# Patient Record
Sex: MALE | Race: WHITE | NOT HISPANIC OR LATINO | Employment: OTHER | URBAN - METROPOLITAN AREA
[De-identification: names, ages, dates, MRNs, and addresses within clinical notes are randomized per-mention and may not be internally consistent; named-entity substitution may affect disease eponyms.]

---

## 2020-02-24 ENCOUNTER — OFFICE VISIT (OUTPATIENT)
Dept: GASTROENTEROLOGY | Facility: CLINIC | Age: 85
End: 2020-02-24
Payer: COMMERCIAL

## 2020-02-24 VITALS
SYSTOLIC BLOOD PRESSURE: 120 MMHG | WEIGHT: 174 LBS | HEIGHT: 66 IN | DIASTOLIC BLOOD PRESSURE: 76 MMHG | BODY MASS INDEX: 27.97 KG/M2 | HEART RATE: 78 BPM

## 2020-02-24 DIAGNOSIS — Z12.11 COLON CANCER SCREENING: ICD-10-CM

## 2020-02-24 DIAGNOSIS — F41.9 ANXIETY: ICD-10-CM

## 2020-02-24 DIAGNOSIS — R11.0 NAUSEA: Primary | ICD-10-CM

## 2020-02-24 DIAGNOSIS — R05.9 COUGH: ICD-10-CM

## 2020-02-24 DIAGNOSIS — K58.0 IRRITABLE BOWEL SYNDROME WITH DIARRHEA: ICD-10-CM

## 2020-02-24 DIAGNOSIS — R10.13 EPIGASTRIC BURNING SENSATION: ICD-10-CM

## 2020-02-24 PROCEDURE — 99204 OFFICE O/P NEW MOD 45 MIN: CPT | Performed by: NURSE PRACTITIONER

## 2020-02-24 RX ORDER — ONDANSETRON 4 MG/1
4 TABLET, ORALLY DISINTEGRATING ORAL EVERY 6 HOURS PRN
Qty: 20 TABLET | Refills: 2 | Status: SHIPPED | OUTPATIENT
Start: 2020-02-24 | End: 2020-06-24 | Stop reason: SDDI

## 2020-02-24 RX ORDER — FAMOTIDINE 20 MG/1
20 TABLET, FILM COATED ORAL DAILY
COMMUNITY
End: 2020-02-24 | Stop reason: DRUGHIGH

## 2020-02-24 RX ORDER — FAMOTIDINE 40 MG/1
40 TABLET, FILM COATED ORAL DAILY
Qty: 30 TABLET | Refills: 2 | Status: SHIPPED | OUTPATIENT
Start: 2020-02-24 | End: 2020-03-25 | Stop reason: ALTCHOICE

## 2020-02-24 RX ORDER — DICYCLOMINE HCL 20 MG
TABLET ORAL
COMMUNITY
Start: 2020-02-01 | End: 2020-06-24 | Stop reason: SDDI

## 2020-02-24 RX ORDER — LORAZEPAM 1 MG/1
1 TABLET ORAL DAILY PRN
COMMUNITY
Start: 2020-02-01

## 2020-02-24 NOTE — PATIENT INSTRUCTIONS
Continue to avoid the nonsteroidal anti-inflammatory medication ( ibuprofen /Motrin / Advil, Aleve /naproxen, full-dose aspirin)   Recommend smaller more frequent meals and avoid late-night eating ( within 2-3 hours of bed)  Consider elevating the head of your bed at night with blocks or a mattress wedge  You will be scheduled for upper scope here at our endoscopy center      Gastroesophageal Reflux Disease   WHAT YOU NEED TO KNOW:   What is gastroesophageal reflux disease? Gastroesophageal reflux occurs when acid and food in the stomach back up into the esophagus  Gastroesophageal reflux disease (GERD) is reflux that occurs more than twice a week for a few weeks  It usually causes heartburn and other symptoms  GERD can cause other health problems over time if it is not treated  What causes GERD? GERD often occurs when the lower muscle (sphincter) of the esophagus does not close properly  The sphincter normally opens to let food into the stomach  It then closes to keep food and stomach acid in the stomach  If the sphincter does not close properly, stomach acid and food back up (reflux) into the esophagus  The following may increase your risk for GERD:  · Certain foods such as spicy foods, chocolate, foods that contain caffeine, peppermint, and fried foods    · Hiatal hernia    · Certain medicines such as calcium channel blockers (used to treat high blood pressure), allergy medicines, sedatives, or antidepressants    · Pregnancy or obesity    · Lying down after a meal    · Drinking alcohol or smoking  What are the signs and symptoms of GERD? Heartburn is the most common symptom of GERD  You may feel burning pain in your chest or below the breast bone  This usually occurs after meals and spreads to your neck, jaw, or shoulder  The pain gets better when you change positions   You may also have any of the following:  · Bitter or acid taste in your mouth    · Dry cough    · Trouble swallowing or pain with swallowing    · Hoarseness or sore throat    · Frequent burping or hiccups    · Feeling of fullness soon after you start eating  How is GERD diagnosed? Your healthcare provider will ask about your symptoms and when they started  Tell him or her about other medical conditions you have, your eating habits, and your activities  You may also need any of the following:  · Esophageal pH monitoring  is used to place a small probe inside your esophagus and stomach to check the amount of acid  · An endoscopy  is a procedure used to look at the inside of your esophagus and stomach  An endoscope is a bendable tube with a light and camera on the end  Your healthcare provider may remove a small sample of tissue and send it to a lab for tests  · Upper GI x-rays  are done to take pictures of your stomach and intestines (bowel)  You may be given a chalky liquid to drink before the pictures are taken  This liquid helps your stomach and intestines show up better on the x-rays  · Esophageal manometry  is a test that shows how your esophagus pushes food and fluid to your stomach  It also shows the pressures in your esophagus and stomach  It may show a hiatal hernia  How is GERD treated? · Medicines  are used to decrease stomach acid  Medicine may also be used to help your lower esophageal sphincter and stomach contract (tighten) more  · Surgery  is done to wrap the upper part of the stomach around the esophageal sphincter  This will strengthen the sphincter and prevent reflux  How can I help manage GERD? · Do not have foods or drinks that may increase heartburn  These include chocolate, peppermint, fried or fatty foods, drinks that contain caffeine, or carbonated drinks (soda)  Other foods include spicy foods, onions, tomatoes, and tomato-based foods  Do not have foods or drinks that can irritate your esophagus, such as citrus fruits, juices, and alcohol  · Do not eat large meals    When you eat a lot of food at one time, your stomach needs more acid to digest it  Eat 6 small meals each day instead of 3 large ones, and eat slowly  Do not eat meals 2 to 3 hours before bedtime  · Elevate the head of your bed  Place 6-inch blocks under the head of your bed frame  You may also use more than one pillow under your head and shoulders while you sleep  · Maintain a healthy weight  If you are overweight, weight loss may help relieve symptoms of GERD  · Do not smoke  Smoking weakens the lower esophageal sphincter and increases the risk of GERD  Ask your healthcare provider for information if you currently smoke and need help to quit  E-cigarettes or smokeless tobacco still contain nicotine  Talk to your healthcare provider before you use these products  · Do not wear clothing that is tight around your waist   Tight clothing can put pressure on your stomach and cause or worsen GERD symptoms  When should I seek immediate care? · You feel full and cannot burp or vomit  · You have severe chest pain and sudden trouble breathing  · Your bowel movements are black, bloody, or tarry-looking  · Your vomit looks like coffee grounds or has blood in it  When should I contact my healthcare provider? · You vomit large amounts, or you vomit often  · You have trouble breathing after you vomit  · You have trouble swallowing, or pain with swallowing  · You are losing weight without trying  · Your symptoms get worse or do not improve with treatment  · You have questions or concerns about your condition or care  CARE AGREEMENT:   You have the right to help plan your care  Learn about your health condition and how it may be treated  Discuss treatment options with your caregivers to decide what care you want to receive  You always have the right to refuse treatment  The above information is an  only  It is not intended as medical advice for individual conditions or treatments   Talk to your doctor, nurse or pharmacist before following any medical regimen to see if it is safe and effective for you  © 2017 [employer] Information is for End User's use only and may not be sold, redistributed or otherwise used for commercial purposes  All illustrations and images included in CareNotes® are the copyrighted property of A [employer] M [employer]  or [employer] Fernandez

## 2020-02-24 NOTE — PROGRESS NOTES
[id number] [facility] - Outpatient Consultation  Ayaka Alejandro 82 y o  male MRN: [id number]  Encounter: [id number]    ASSESSMENT AND PLAN:      1  Nausea  Intermittent nausea, often preprandial beginning in November  History of heavy NSAID use on 1-2 occasions, prednisone  Differential considerations include PUD, gastritis, H pylori, esophagitis, Vega's which may be exacerbated by previous NSAID and steroid use  Biliary disease, more likely biliary dyskinesia is also a consideration although less likely with normal LFTs and no postprandial symptoms  -will check abdominal US  -Zofran p r n   -advised to abstain from NSAID use  -schedule EGD-BM EC  -if ultrasound and EGD are negative and symptoms persist, would proceed with HIDA scan to exclude biliary dyskinesia  - US abdomen complete; Future  - ondansetron (ZOFRAN-ODT) 4 mg disintegrating tablet; Take 1 tablet (4 mg total) by mouth every 6 (six) hours as needed for nausea or vomiting  Dispense: 20 tablet; Refill: 2    2  Epigastric burning sensation  Intermittent episodes since November, worse preprandial, and occasionally occurs with reflux symptoms when supine  Differential considerations include PUD, gastritis, H pylori, esophagitis, Vega's which may be exacerbated by previous NSAID and oral steroid use  Less likely biliary disease as outlined above  Some relief with famotidine 20 mg daily  Reported interaction with PPI and lorazepam so refuses to take these medications  -will increase famotidine to 40 mg daily  -schedule EGD-BM EC  -will check abdominal US  -if EGD and ultrasound negative, consider proceeding with HIDA scan to exclude biliary dyskinesia although less likely  - US abdomen complete; Future  - famotidine (PEPCID) 40 MG tablet; Take 1 tablet (40 mg total) by mouth daily  Dispense: 30 tablet; Refill: 2    3  Cough  Dry, intermittent and may represent atypical symptoms of reflux    Plan as outlined above     4  Irritable bowel syndrome with diarrhea  Longstanding history since previous colonoscopy in 2009 and associated with anxiety  Very rare need for dicyclomine  Took recently for epigastric symptoms which caused constipation  This has been relieved with stool softeners  Otherwise no lower GI symptoms  5  Colon cancer screening  Average risk  Last colonoscopy 12/01/2009 per Dr Atkinson Night which was normal   Ten year recall advised at the time but would not repeat for advanced age  Would schedule if clinically indicated  6  Anxiety  Longstanding and treated with lorazepam p r n  Daughter reports that he may have a psychiatric evaluation for possible medication alternatives  Side effects with Lexapro previously  Follows with PCP for this  Followup Appointment:  4 weeks  ______________________________________________________________________    Chief Complaint   Patient presents with    Change in Bowel Habits     referred by pcp, Dr Mayra Kline Irritable Bowel Syndrome    Nausea       HPI:   Gabriela Camacho is a 82 y o   male who presents , at the request of his PCP, Dr Emilio Lamb, and accompanied by his daughter for stomach issues    The patient has been experiencing predominantly preprandial nausea since prior to Thanksgiving in November  No vomiting  This is often improved postprandial   He is also describing feeling hot in his epigastric area  He further quantify so this as a burning sensation in his epigastric area  Has also been experiencing a dry cough since December  Has experienced couple episodes of bitter taste in his mouth when lying down at night  Two occasions of using Motrin for several weeks and also took a Medrol Dosepak back in May for sciatica  The 2nd occasion of Motrin use was later in the spring, early summer but he cannot recall exactly when  He does admit to taking up to 12 OTC Motrin pills daily for the pain    Previous caffeine use but not excessive and completely stopped drinking coffee recently  Denies chest pain, anorexia, dysphagia, odynophagia, early satiety  Reports that the cough has been improved since beginning Pepcid 20 mg daily  Was originally prescribed Prilosec but would not continue taking as he was sure was interacting with his lorazepam   Because of the symptoms, he is meeting maintaining a light diet to include dry bread, eggs, chicken noodle soup  No unintended weight loss  He does readily admit to significant anxiety including anxiety about using lorazepam   His daughter reports that this fear is because his girlfriend says he should not be taking this medication  Concerned that the lorazepam could be causing his symptoms  The patient reports being diagnosed with IBS, diarrhea predominant many years ago by Dr Peter Chandler who prescribed him Bentyl  He only uses this rarely and reports having daily soft formed stools  When the epigastric symptoms started, he tried using Bentyl 1-2 times per day but got constipated from this so stopped using  Reports that it was not particularly helpful with those upper symptoms anyway  He generally eats prunes and apricots daily  Denies melena, hematochezia, lower abdominal or rectal pain        Historical Information   Past Medical History:   Diagnosis Date    Anemia     Anxiety     Basal cell carcinoma     Hiatal hernia     Hypertension     Irritable bowel syndrome     Melanoma ([address] )     Scan    Sciatica of left side 06/2019    Vertigo 2008     Past Surgical History:   Procedure Laterality Date    COLONOSCOPY  12/01/2009     by Dr Mary Zuniga and was normal    HIATAL HERNIA REPAIR  10/2018    First repair was in 2000, 2nd in 2018   [facility] Bilateral      Social History     Substance and Sexual Activity   Alcohol Use Not Currently    Frequency: 2-3 times a week    Drinks per session: 1 or 2    Binge frequency: Never    Comment:  recently he is not having any alcohol Social History     Substance and Sexual Activity   Drug Use Never     Social History     Tobacco Use   Smoking Status Never Smoker   Smokeless Tobacco Never Used     Family History   Problem Relation Age of Onset    Post-traumatic stress disorder Father     Cancer Family         [facility] a[facility] and uncles[facility]    Colon cancer Neg Hx     Colon polyps Neg Hx        Meds/Allergies     Current Outpatient Medications:     dicyclomine (BENTYL) 20 mg tablet    LORazepam (ATIVAN) 1 mg tablet    famotidine (PEPCID) 40 MG tablet    ondansetron (ZOFRAN-ODT) 4 mg disintegrating tablet    No Known Allergies    PHYSICAL EXAM:    Blood pressure 120/76, pulse 78, height 5' 6" (1 676 m), weight 78 9 kg (174 lb)  Body mass index is 28 08 kg/m²  General Appearance: NAD, cooperative, alert  Head:  Normocephalic, atraumatic  Eyes: Anicteric, PERRLA, EOMI  ENT:  Normal external appearance, normal mucosa  Neck:  Supple, symmetrical, trachea midline,   Resp:  Clear to auscultation bilaterally; no rales, rhonchi or wheezing; respirations unlabored   CV:  S1 S2, Regular rate and rhythm; no murmur, rub, or gallop  GI:  Soft, non-tender, non-distended; normal bowel sounds; no masses, no organomegaly   Rectal: Deferred  Musculoskeletal: No cyanosis, clubbing or edema  Normal ROM  Skin:  No jaundice, rashes, or lesions   Heme/Lymph: No palpable cervical lymphadenopathy  Psych: Normal affect, good eye contact  Neuro: No gross deficits, AAOx3    Lab Results:  Reviewed lab results from September 2019 and February, 2020  No results found for: WBC, HGB, HCT, MCV, PLT  No results found for: NA, K, CL, CO2, ANIONGAP, BUN, CREATININE, GLUCOSE, GLUF, CALCIUM, CORRECTEDCA, AST, ALT, ALKPHOS, PROT, BILITOT, EGFR  No results found for: IRON, TIBC, FERRITIN  No results found for: LIPASE    Radiology Results:   No results found      REVIEW OF SYSTEMS:    CONSTITUTIONAL: Denies any fever, chills, rigors, but reports fatigue and weight loss  HEENT: No earache or tinnitus  Denies hearing loss or visual disturbances  CARDIOVASCULAR: No chest pain or palpitations  Reports occasional irregular heartbeat  RESPIRATORY: Denies any cough, hemoptysis, shortness of breath or dyspnea on exertion  GASTROINTESTINAL: As noted in the History of Present Illness  Reports decreased appetite and change in bowel habits  GENITOURINARY: No problems with urination  Denies any hematuria or dysuria  NEUROLOGIC: No dizziness or vertigo, denies headaches  Reports tremor, tingling/numbness  MUSCULOSKELETAL: Denies any muscle or joint pain  Hematology:  Reports easy bruising  SKIN: Denies skin rashes or itching  ENDOCRINE: Denies excessive thirst  Denies intolerance to heat or cold  PSYCHOSOCIAL:  Reports depression, anxiety and difficulty sleeping  Denies any recent memory loss

## 2020-02-24 NOTE — LETTER
February 24, 2020     Blanca Cueto DO  [id number]  [facility] [address]    Patient: Brianna Quiñonez   YOB: 1935   Date of Visit: 2/24/2020       Dear Dr Oscar Hensley: Thank you for referring Sumit Mendoza to me for evaluation  Below are my notes for this consultation  If you have questions, please do not hesitate to call me  I look forward to following your patient along with you           Sincerely,        ACRLOS Gramajo        CC: No Recipients

## 2020-02-26 ENCOUNTER — LAB REQUISITION (OUTPATIENT)
Dept: LAB | Facility: HOSPITAL | Age: 85
End: 2020-02-26
Payer: COMMERCIAL

## 2020-02-26 DIAGNOSIS — R10.13 EPIGASTRIC BURNING SENSATION: Primary | ICD-10-CM

## 2020-02-26 DIAGNOSIS — R10.13 EPIGASTRIC PAIN: ICD-10-CM

## 2020-02-26 DIAGNOSIS — K29.70 GASTRITIS, UNSPECIFIED, WITHOUT BLEEDING: ICD-10-CM

## 2020-02-26 DIAGNOSIS — R11.0 NAUSEA: ICD-10-CM

## 2020-02-26 PROCEDURE — 88305 TISSUE EXAM BY PATHOLOGIST: CPT | Performed by: PATHOLOGY

## 2020-02-26 PROCEDURE — 88313 SPECIAL STAINS GROUP 2: CPT | Performed by: PATHOLOGY

## 2020-02-26 RX ORDER — PANTOPRAZOLE SODIUM 40 MG/1
40 TABLET, DELAYED RELEASE ORAL DAILY
Qty: 30 TABLET | Refills: 1 | Status: SHIPPED | OUTPATIENT
Start: 2020-02-26 | End: 2020-03-25 | Stop reason: SDUPTHER

## 2020-03-03 NOTE — RESULT ENCOUNTER NOTE
I spoke with the patient's daughter by phone  No EGD recall has appointment scheduled in he will keep it

## 2020-03-23 ENCOUNTER — TELEPHONE (OUTPATIENT)
Dept: GASTROENTEROLOGY | Facility: CLINIC | Age: 85
End: 2020-03-23

## 2020-03-23 NOTE — TELEPHONE ENCOUNTER
Pt's daughter, Fitz Robles, states her Dad is flipping out because he thinks his meds will make him more susceptible to getting the virus/wants to cut back on meds; takes Protonix and Pepcid  He has OV Wed/she questions if he should keep it?  CB# [phone]

## 2020-03-23 NOTE — TELEPHONE ENCOUNTER
Spoke with daughter  She prefers patient not coming to office due to his age and Cody concerns  They are willing to change OV to telemedicine  Please contact daughter to schedule

## 2020-03-25 ENCOUNTER — TELEMEDICINE (OUTPATIENT)
Dept: GASTROENTEROLOGY | Facility: CLINIC | Age: 85
End: 2020-03-25
Payer: COMMERCIAL

## 2020-03-25 VITALS — HEIGHT: 66 IN | WEIGHT: 170 LBS | BODY MASS INDEX: 27.32 KG/M2

## 2020-03-25 DIAGNOSIS — R05.9 COUGH: ICD-10-CM

## 2020-03-25 DIAGNOSIS — K58.0 IRRITABLE BOWEL SYNDROME WITH DIARRHEA: ICD-10-CM

## 2020-03-25 DIAGNOSIS — R11.0 NAUSEA: ICD-10-CM

## 2020-03-25 DIAGNOSIS — F41.9 ANXIETY: ICD-10-CM

## 2020-03-25 DIAGNOSIS — R10.13 EPIGASTRIC BURNING SENSATION: ICD-10-CM

## 2020-03-25 DIAGNOSIS — Z12.11 COLON CANCER SCREENING: ICD-10-CM

## 2020-03-25 DIAGNOSIS — K25.9 GASTRIC EROSION, UNSPECIFIED CHRONICITY: Primary | ICD-10-CM

## 2020-03-25 PROCEDURE — 99214 OFFICE O/P EST MOD 30 MIN: CPT | Performed by: NURSE PRACTITIONER

## 2020-03-25 RX ORDER — PANTOPRAZOLE SODIUM 40 MG/1
40 TABLET, DELAYED RELEASE ORAL DAILY
Qty: 30 TABLET | Refills: 4 | Status: SHIPPED | OUTPATIENT
Start: 2020-03-25 | End: 2020-06-24 | Stop reason: SDDI

## 2020-03-25 NOTE — PATIENT INSTRUCTIONS
Continue the pantoprazole daily in the morning   stop the famotidine at bedtime   continue to use the dicyclomine ( Bentyl) as needed for abdominal cramping   continue to use Zofran as needed for nausea  Continue to avoid NSAIDs ( ibuprofen /Motrin / Advil, Aleve /naproxen, full-dose aspirin)      Gastroesophageal Reflux Disease   WHAT YOU NEED TO KNOW:   What is gastroesophageal reflux disease? Gastroesophageal reflux occurs when acid and food in the stomach back up into the esophagus  Gastroesophageal reflux disease (GERD) is reflux that occurs more than twice a week for a few weeks  It usually causes heartburn and other symptoms  GERD can cause other health problems over time if it is not treated  What causes GERD? GERD often occurs when the lower muscle (sphincter) of the esophagus does not close properly  The sphincter normally opens to let food into the stomach  It then closes to keep food and stomach acid in the stomach  If the sphincter does not close properly, stomach acid and food back up (reflux) into the esophagus  The following may increase your risk for GERD:  · Certain foods such as spicy foods, chocolate, foods that contain caffeine, peppermint, and fried foods    · Hiatal hernia    · Certain medicines such as calcium channel blockers (used to treat high blood pressure), allergy medicines, sedatives, or antidepressants    · Pregnancy or obesity    · Lying down after a meal    · Drinking alcohol or smoking  What are the signs and symptoms of GERD? Heartburn is the most common symptom of GERD  You may feel burning pain in your chest or below the breast bone  This usually occurs after meals and spreads to your neck, jaw, or shoulder  The pain gets better when you change positions   You may also have any of the following:  · Bitter or acid taste in your mouth    · Dry cough    · Trouble swallowing or pain with swallowing    · Hoarseness or sore throat    · Frequent burping or hiccups    · Feeling of fullness soon after you start eating  How is GERD diagnosed? Your healthcare provider will ask about your symptoms and when they started  Tell him or her about other medical conditions you have, your eating habits, and your activities  You may also need any of the following:  · Esophageal pH monitoring  is used to place a small probe inside your esophagus and stomach to check the amount of acid  · An endoscopy  is a procedure used to look at the inside of your esophagus and stomach  An endoscope is a bendable tube with a light and camera on the end  Your healthcare provider may remove a small sample of tissue and send it to a lab for tests  · Upper GI x-rays  are done to take pictures of your stomach and intestines (bowel)  You may be given a chalky liquid to drink before the pictures are taken  This liquid helps your stomach and intestines show up better on the x-rays  · Esophageal manometry  is a test that shows how your esophagus pushes food and fluid to your stomach  It also shows the pressures in your esophagus and stomach  It may show a hiatal hernia  How is GERD treated? · Medicines  are used to decrease stomach acid  Medicine may also be used to help your lower esophageal sphincter and stomach contract (tighten) more  · Surgery  is done to wrap the upper part of the stomach around the esophageal sphincter  This will strengthen the sphincter and prevent reflux  How can I help manage GERD? · Do not have foods or drinks that may increase heartburn  These include chocolate, peppermint, fried or fatty foods, drinks that contain caffeine, or carbonated drinks (soda)  Other foods include spicy foods, onions, tomatoes, and tomato-based foods  Do not have foods or drinks that can irritate your esophagus, such as citrus fruits, juices, and alcohol  · Do not eat large meals  When you eat a lot of food at one time, your stomach needs more acid to digest it   Eat 6 small meals each day instead of 3 large ones, and eat slowly  Do not eat meals 2 to 3 hours before bedtime  · Elevate the head of your bed  Place 6-inch blocks under the head of your bed frame  You may also use more than one pillow under your head and shoulders while you sleep  · Maintain a healthy weight  If you are overweight, weight loss may help relieve symptoms of GERD  · Do not smoke  Smoking weakens the lower esophageal sphincter and increases the risk of GERD  Ask your healthcare provider for information if you currently smoke and need help to quit  E-cigarettes or smokeless tobacco still contain nicotine  Talk to your healthcare provider before you use these products  · Do not wear clothing that is tight around your waist   Tight clothing can put pressure on your stomach and cause or worsen GERD symptoms  When should I seek immediate care? · You feel full and cannot burp or vomit  · You have severe chest pain and sudden trouble breathing  · Your bowel movements are black, bloody, or tarry-looking  · Your vomit looks like coffee grounds or has blood in it  When should I contact my healthcare provider? · You vomit large amounts, or you vomit often  · You have trouble breathing after you vomit  · You have trouble swallowing, or pain with swallowing  · You are losing weight without trying  · Your symptoms get worse or do not improve with treatment  · You have questions or concerns about your condition or care  CARE AGREEMENT:   You have the right to help plan your care  Learn about your health condition and how it may be treated  Discuss treatment options with your caregivers to decide what care you want to receive  You always have the right to refuse treatment  The above information is an  only  It is not intended as medical advice for individual conditions or treatments   Talk to your doctor, nurse or pharmacist before following any medical regimen to see if it is safe and effective for you  © 2017 [employer] Information is for End User's use only and may not be sold, redistributed or otherwise used for commercial purposes  All illustrations and images included in CareNotes® are the copyrighted property of A [employer] M [employer]  or Carlos Fernandez

## 2020-03-25 NOTE — PROGRESS NOTES
Virtual Regular Visit    Problem List Items Addressed This Visit        Digestive    Irritable bowel syndrome with diarrhea    Gastric erosion - Primary    Relevant Medications    pantoprazole (PROTONIX) 40 mg tablet       Other    Colon cancer screening    Anxiety    RESOLVED: Nausea    RESOLVED: Cough    RESOLVED: Epigastric burning sensation    Relevant Medications    pantoprazole (PROTONIX) 40 mg tablet        1  Gastric erosion, unspecified chronicity  2  Epigastric burning sensation  EGD 02/26/2020 did show erosive gastritis  This was probably precipitated by his NSAID use and anxiety  Pathology negative for H pylori, dysplasia  No longer having any epigastric burning  He is reticent to continue medication  -reviewed GERD diet and lifestyle modifications which will continue  GERD information was provided to the patient  -continue pantoprazole 40 mg daily  If he remains symptom-free at follow-up, will wean and discontinue  -will stop famotidine as requested  -continue avoidance of all NSAIDs  - pantoprazole (PROTONIX) 40 mg tablet; Take 1 tablet (40 mg total) by mouth daily  Dispense: 30 tablet; Refill: 4    3  Nausea  Nausea completely resolved after the addition of daily PPI and H2B at [facility]  There was no evidence for PUD with EGD  Ultrasound and LFTs were normal making biliary disease unlikely  He has used 1 dose of Zofran  Nausea may have been related to the erosive gastritis  -may continue to use Zofran p r n if nausea returns    4  Cough  Dry intermittent cough may have represented atypical reflux symptoms  Completely resolved after initiation of PPI  5  Irritable bowel syndrome with diarrhea  Longstanding history since previous colonoscopy in 2009 and associated with anxiety  he is exercising to manage his anxiety and no longer using lorazepam   Very rare need for dicyclomine  No lower GI symptoms  He is having regular bowel movements eating daily plums and apricots      -continue with dietary discretion  -continue exercise to help with anxiety  -continue dicyclomine p r n     6  Colon cancer screening  Average risk  Last colonoscopy 12/01/2009 per Dr Jean Ho which was normal   Ten year recall advised but would not repeat for advanced age  Would schedule if clinically indicated  7  Anxiety  Longstanding and previous treated with lorazepam p r n  Follows with PCP for this  Exacerbates his IBS  He is no longer using lorazepam and reports that he is using daily exercise to control his anxiety  The daughter confirms  Reason for visit is follow up from EGD procedure    Encounter provider CARLOS Stephenson    Provider located at [address] [address] [phone]      Recent Visits  Date Type Provider Dept   03/23/20 Telephone Frida Gonzalez   Showing recent visits within past 7 days and meeting all other requirements     Future Appointments  No visits were found meeting these conditions  Showing future appointments within next 150 days and meeting all other requirements        After connecting through [facility], the patient was identified by name and date of birth  Erica Paez was informed that this is a telemedicine visit and that the visit is being conducted through [employer] and patient was informed that this is not a secure, HIPAA-complaint platform  he agrees to proceed  which may not be secure and therefore, might not be HIPAA-compliant  My office door was closed  No one else was in the room  He acknowledged consent and understanding of privacy and security of the video platform  The patient has agreed to participate and understands they can discontinue the visit at any time  Aj Sandhu is a 82 y o  male who is evaluated by video and attended by his daughter as well    Reports that he is no longer having any epigastric pain or nausea since starting on daily PPI and H2B at HS  He wants to stop medications  He is using exercise to manage his anxiety and reports completely stopping the use of lorazepam   Denies chest pain, UGI or alarm symptoms  He is having 1 formed bowel movement about every other day and is eating plums and apricots to facilitate  He has not used any dicyclomine in 3 weeks, experiencing no lower abdominal cramping  Denies melena, hematochezia  Appetite is normal   Weight is stable  Past Medical History:   Diagnosis Date    Anemia     Anxiety     Basal cell carcinoma     Hiatal hernia     Hypertension     Irritable bowel syndrome     Melanoma ([address] )     Scan    Sciatica of left side 06/2019    Vertigo 2008       Past Surgical History:   Procedure Laterality Date    COLONOSCOPY  12/01/2009     by Dr Bette Hampton and was normal    HIATAL HERNIA REPAIR  10/2018    First repair was in 2000, 2nd in 2018   [facility]        Current Outpatient Medications   Medication Sig Dispense Refill    dicyclomine (BENTYL) 20 mg tablet TAKE 1 TABLET AS NEEDED FOUR TIMES A DAY ORALLY 30 DAYS      LORazepam (ATIVAN) 1 mg tablet Take 1 mg by mouth daily as needed      ondansetron (ZOFRAN-ODT) 4 mg disintegrating tablet Take 1 tablet (4 mg total) by mouth every 6 (six) hours as needed for nausea or vomiting 20 tablet 2    pantoprazole (PROTONIX) 40 mg tablet Take 1 tablet (40 mg total) by mouth daily 30 tablet 4     No current facility-administered medications for this visit        Family History   Problem Relation Age of Onset    Post-traumatic stress disorder Father     Cancer Family         [facility] and [facility]    Colon cancer Neg Hx     Colon polyps Neg Hx      Social History     Socioeconomic History    Marital status:      Spouse name: None    Number of children: None    Years of education: None    Highest education level: None   Occupational History    None   Social Needs    Financial resource strain: None    Food insecurity:     Worry: None     Inability: None    Transportation needs:     Medical: None     Non-medical: None   Tobacco Use    Smoking status: Never Smoker    Smokeless tobacco: Never Used   Substance and Sexual Activity    Alcohol use: Not Currently     Frequency: 2-3 times a week     Drinks per session: 1 or 2     Binge frequency: Never     Comment:  recently he is not having any alcohol    Drug use: Never    Sexual activity: None   Lifestyle    Physical activity:     Days per week: None     Minutes per session: None    Stress: None   Relationships    Social connections:     Talks on phone: None     Gets together: None     Attends [employer] witness service: None     Active member of club or organization: None     Attends meetings of clubs or organizations: None     Relationship status: None    Intimate partner violence:     Fear of current or ex partner: None     Emotionally abused: None     Physically abused: None     Forced sexual activity: None   Other Topics Concern    None   Social History Narrative    None         No Known Allergies    Review of Systems     ROS:  All systems were reviewed and positives noted as per HPI  All other systems were reported as negative  Physical Exam     General Appearance: NAD, cooperative, alert  Head:  Normocephalic, atraumatic  ENT:  Normal external appearance, normal mucosa  Neck:  Supple, symmetrical, trachea midline  GI:   appeared non-tender, non-distended per visual assessment assisted by patient's daughter who palpated his abdomen  Musculoskeletal:  Normal ROM  Psych: Normal affect, good eye contact  Neuro: No gross deficits, AAOx3      I spent 25 minutes with the patient during this visit  Follow-up in 3 months

## 2020-03-27 ENCOUNTER — TELEPHONE (OUTPATIENT)
Dept: GASTROENTEROLOGY | Facility: CLINIC | Age: 85
End: 2020-03-27

## 2020-03-27 NOTE — TELEPHONE ENCOUNTER
Pt's daughter, Penelope Walsh, left [facility] asking if someone can call back in the Famotidine 40 mg script? [facility] messed up and cancelled the script instead of taking off automatic refill  # [phone]

## 2020-06-24 ENCOUNTER — OFFICE VISIT (OUTPATIENT)
Dept: GASTROENTEROLOGY | Facility: CLINIC | Age: 85
End: 2020-06-24
Payer: COMMERCIAL

## 2020-06-24 VITALS
WEIGHT: 170 LBS | SYSTOLIC BLOOD PRESSURE: 156 MMHG | HEART RATE: 71 BPM | TEMPERATURE: 98.1 F | HEIGHT: 66 IN | DIASTOLIC BLOOD PRESSURE: 96 MMHG | BODY MASS INDEX: 27.32 KG/M2

## 2020-06-24 DIAGNOSIS — K58.0 IRRITABLE BOWEL SYNDROME WITH DIARRHEA: ICD-10-CM

## 2020-06-24 DIAGNOSIS — K25.9 GASTRIC EROSION, UNSPECIFIED CHRONICITY: Primary | ICD-10-CM

## 2020-06-24 DIAGNOSIS — F41.9 ANXIETY: ICD-10-CM

## 2020-06-24 DIAGNOSIS — Z12.11 COLON CANCER SCREENING: ICD-10-CM

## 2020-06-24 PROCEDURE — 99214 OFFICE O/P EST MOD 30 MIN: CPT | Performed by: NURSE PRACTITIONER

## 2021-03-02 DIAGNOSIS — Z23 ENCOUNTER FOR IMMUNIZATION: ICD-10-CM

## 2021-03-18 ENCOUNTER — IMMUNIZATIONS (OUTPATIENT)
Dept: FAMILY MEDICINE CLINIC | Facility: HOSPITAL | Age: 86
End: 2021-03-18

## 2021-03-18 DIAGNOSIS — Z23 ENCOUNTER FOR IMMUNIZATION: Primary | ICD-10-CM

## 2021-03-18 PROCEDURE — 0001A SARS-COV-2 / COVID-19 MRNA VACCINE (PFIZER-BIONTECH) 30 MCG: CPT

## 2021-03-18 PROCEDURE — 91300 SARS-COV-2 / COVID-19 MRNA VACCINE (PFIZER-BIONTECH) 30 MCG: CPT

## 2021-04-09 ENCOUNTER — IMMUNIZATIONS (OUTPATIENT)
Dept: FAMILY MEDICINE CLINIC | Facility: HOSPITAL | Age: 86
End: 2021-04-09

## 2021-04-09 DIAGNOSIS — Z23 ENCOUNTER FOR IMMUNIZATION: Primary | ICD-10-CM

## 2021-04-09 PROCEDURE — 91300 SARS-COV-2 / COVID-19 MRNA VACCINE (PFIZER-BIONTECH) 30 MCG: CPT

## 2021-04-09 PROCEDURE — 0002A SARS-COV-2 / COVID-19 MRNA VACCINE (PFIZER-BIONTECH) 30 MCG: CPT

## 2022-01-19 ENCOUNTER — OFFICE VISIT (OUTPATIENT)
Dept: GASTROENTEROLOGY | Facility: CLINIC | Age: 87
End: 2022-01-19
Payer: COMMERCIAL

## 2022-01-19 VITALS
HEIGHT: 66 IN | DIASTOLIC BLOOD PRESSURE: 78 MMHG | SYSTOLIC BLOOD PRESSURE: 154 MMHG | BODY MASS INDEX: 25.71 KG/M2 | WEIGHT: 160 LBS

## 2022-01-19 DIAGNOSIS — R19.4 CHANGE IN BOWEL HABITS: Primary | ICD-10-CM

## 2022-01-19 DIAGNOSIS — R10.31 RIGHT LOWER QUADRANT ABDOMINAL PAIN: ICD-10-CM

## 2022-01-19 PROCEDURE — 99214 OFFICE O/P EST MOD 30 MIN: CPT | Performed by: INTERNAL MEDICINE

## 2022-01-19 RX ORDER — VITAMIN B COMPLEX
TABLET ORAL
COMMUNITY
Start: 2019-01-01

## 2022-01-19 NOTE — PATIENT INSTRUCTIONS
Continue Metamucil once a day with a big glass of water  Use a half cap full of MiraLax as needed if you feel like a getting constipated  Her CT scan of the abdomen / pelvis to evaluate right lower quadrant abdominal pain     Depending how you respond to the current regiment and what the CT scan looks like can decide whether we do a colonoscopy

## 2022-01-19 NOTE — PROGRESS NOTES
[facility] - Outpatient Consultation  Yessica Combs 82 y o  male MRN: [id number]  Encounter: [id number]          ASSESSMENT AND PLAN:      1  Change in bowel habits  2  Right lower quadrant abdominal pain  Previous history of diarrhea predominant IBS now more issues with constipation and right lower quadrant abdominal pain  Metamucil no help  MiraLax gave him diarrhea  Exam with fullness in the right lower quadrant  Last colonoscopy 2009  - CT abdomen pelvis w contrast; Future  - continue Metamucil daily  - use MiraLax half cap full as needed  - ultimately may need repeat colonoscopy    ______________________________________________________________________    HPI:  The patient is seen in the office today for evaluation of his change in bowel habits in long right lower quadrant abdominal pain  He has been seen in the office secondary to erosive gastritis as well as some diarrhea predominant irritable bowel syndrome  He reports over the last 6 months he has received 2 courses of antibiotics once for cellulitis and once for Lyme disease  Following that he has had issues with his bowels  He reports constipation incomplete evacuation  Denies any rectal bleeding  He reports increasing issues with right lower quadrant abdominal pain  He denies any upper GI symptoms  He has put on Metamucil which did not really help  A trial of MiraLax resulted in diarrhea  His weight has been stable  His last colonoscopy was 2009  He reports ongoing issues with mild confusion  He is being evaluated by Neurology  REVIEW OF SYSTEMS:    CONSTITUTIONAL: Denies any fever, chills, rigors, and weight loss  HEENT: No earache or tinnitus  Denies hearing loss or visual disturbances  CARDIOVASCULAR: No chest pain or palpitations  RESPIRATORY: Denies any cough, hemoptysis, shortness of breath or dyspnea on exertion  GASTROINTESTINAL: As noted in the History of Present Illness  GENITOURINARY: No problems with urination  Denies any hematuria or dysuria  NEUROLOGIC: No dizziness or vertigo, denies headaches  MUSCULOSKELETAL: Denies any muscle or joint pain  SKIN: Denies skin rashes or itching  ENDOCRINE: Denies excessive thirst  Denies intolerance to heat or cold  PSYCHOSOCIAL: Denies depression or anxiety  Denies any recent memory loss  Historical Information   Past Medical History:   Diagnosis Date    Anemia     Anxiety     Basal cell carcinoma     [facility] [address] [address] [address] 2017    melanoma    Hiatal hernia     Hypertension     Irritable bowel syndrome     [facility]an[facility]     Scan    Sciatica of left side 06/2019    Vertigo 2008     Past Surgical History:   Procedure Laterality Date    COLONOSCOPY  12/01/2009     by Dr Shelby Denton and was normal    HIATAL HERNIA REPAIR  10/2018    First repair was in 2000, 2nd in 2018   [facility] Bilateral      Social History   Social History     Substance and Sexual Activity   Alcohol Use Not Currently    Alcohol/week: 0 0 standard drinks    Comment:  recently he is not having any alcohol     Social History     Substance and Sexual Activity   Drug Use Never     Social History     Tobacco Use   Smoking Status Never Smoker   Smokeless Tobacco Never Used     Family History   Problem Relation Age of Onset    Post-traumatic stress disorder Father     Depression Father         PTSD    Cancer Family         Multiple cancers in aunts and uncles, in [facility] Colon cancer Neg Hx     Colon polyps Neg Hx        Meds/Allergies       Current Outpatient Medications:     cholecalciferol (VITAMIN D3) 25 mcg (1,000 units) tablet    cyanocobalamin (VITAMIN B-12) 500 MCG tablet    Multiple Vitamin (MULTIVITAMIN ADULT PO)    psyllium (METAMUCIL) 58 6 % packet    LORazepam (ATIVAN) 1 mg tablet    No Known Allergies        Objective     Blood pressure 154/78, height 5' 6" (1 676 m), weight 72 6 kg (160 lb)   Body mass index is 25 82 kg/m²         PHYSICAL EXAM:      General Appearance:   Alert, cooperative, no distress   HEENT:   Normocephalic, atraumatic, anicteric      Neck:  Supple, symmetrical, trachea midline   Lungs:   Clear to auscultation bilaterally; no rales, rhonchi or wheezing; respirations unlabored    Heart[de-identified]   Regular rate and rhythm; no murmur, rub, or gallop  Abdomen:   Soft, non-tender, non-distended; normal bowel sounds; nontender right lower quadrant mass, no organomegaly    Genitalia:   Deferred    Rectal:   Deferred    Extremities:  No cyanosis, clubbing or edema    Pulses:  2+ and symmetric    Skin:  No jaundice, rashes, or lesions    Lymph nodes:  No palpable cervical lymphadenopathy        Lab Results:    CMP, CBC, & B12/folate normal (12/15/21)      Radiology Results:   No results found

## 2022-02-04 ENCOUNTER — TELEPHONE (OUTPATIENT)
Dept: GASTROENTEROLOGY | Facility: CLINIC | Age: 87
End: 2022-02-04

## 2022-02-04 NOTE — TELEPHONE ENCOUNTER
Amie from [facility] CT Scan states Pt has proc Monday at 2:00; last labs were from 12/15-too old  Conf'd last labs I see are 12/15 from [facility]   If Luis Alberto Couch [phone]

## 2022-02-04 NOTE — TELEPHONE ENCOUNTER
Spoke to family member  Pt had labs in January at [facility]   Printed labs from 1/24/2022    [facility] CT notified and labs faxed to [phone]

## 2022-03-21 ENCOUNTER — OFFICE VISIT (OUTPATIENT)
Dept: GASTROENTEROLOGY | Facility: CLINIC | Age: 87
End: 2022-03-21
Payer: COMMERCIAL

## 2022-03-21 VITALS
WEIGHT: 172.6 LBS | HEIGHT: 66 IN | DIASTOLIC BLOOD PRESSURE: 80 MMHG | SYSTOLIC BLOOD PRESSURE: 152 MMHG | BODY MASS INDEX: 27.74 KG/M2

## 2022-03-21 DIAGNOSIS — K59.01 SLOW TRANSIT CONSTIPATION: ICD-10-CM

## 2022-03-21 DIAGNOSIS — K21.9 GASTROESOPHAGEAL REFLUX DISEASE, UNSPECIFIED WHETHER ESOPHAGITIS PRESENT: Primary | ICD-10-CM

## 2022-03-21 PROCEDURE — 99214 OFFICE O/P EST MOD 30 MIN: CPT | Performed by: INTERNAL MEDICINE

## 2022-03-21 NOTE — PROGRESS NOTES
[facility] - Outpatient Follow-up Note  Uriarte  82 y o  male MRN: [id number]  Encounter: [id number]    ASSESSMENT AND PLAN:      1  Gastroesophageal reflux disease, unspecified whether esophagitis present  Vague upper GI symptoms which could be consistent with acid reflux disease  Does not want to take any acid blockers  - elevate the head of the bed  - avoid eating before going to bed  - okay to use low-dose Pepcid at bedtime if he is willing    2  Slow transit constipation  History of diarrhea predominant IBS  Now having more issues with constipation  Reports even low-dose MiraLax gives him diarrhea  Colace has been ineffective in the past  - fiber supplementation daily  - can use low-dose MiraLax or Colace as needed for constipation  - at this point time we are not going to repeat his colonoscopy      Followup Appointment:  6 months  ______________________________________________________________________    Chief Complaint   Patient presents with    Follow IBS     HPI:  The patient is seen back in the office today  He was evaluated early this year related to right-sided abdominal pain and constipation  He also had issues with GERD  CT scan revealed significant constipation but no other significant findings  He was treated with Metamucil and increase fluid intake  He was supposed to use a half a capful of MiraLax daily since he reports MiraLax gives him diarrhea  He reports he is moving his bowels better on the Metamucil alone  He is continues to be apprehensive to take MiraLax or Colace  He continues to have vague right-sided abdominal pain  He is also having issues with nighttime reflux and heartburn  He reports that he has issues sleeping will sometimes wake up in the middle the night  He denies any dysphagia, odynophagia, early satiety  His weight is stable      Historical Information   Past Medical History:   Diagnosis Date    Anemia     Anxiety  Basal cell carcinoma     Cancer ([address] 2017    melanoma    Hiatal hernia     Hypertension     Irritable bowel syndrome     Melanoma ([address]     Scan    Sciatica of left side 06/2019    Vertigo 2008     Past Surgical History:   Procedure Laterality Date    COLONOSCOPY  12/01/2009     by Dr Bettye Ann and was normal    HIATAL HERNIA REPAIR  10/2018    First repair was in 2000, 2nd in 2018   [facility] Bilateral      Social History     Substance and Sexual Activity   Alcohol Use Not Currently    Alcohol/week: 0 0 standard drinks    Comment:  recently he is not having any alcohol     Social History     Substance and Sexual Activity   Drug Use Never     Social History     Tobacco Use   Smoking Status Never Smoker   Smokeless Tobacco Never Used     Family History   Problem Relation Age of Onset    Post-traumatic stress disorder Father     Depression Father         PTSD    Cancer Family         Multiple cancers in aunts and uncles, in [place] Colon cancer Neg Hx     Colon polyps Neg Hx          Current Outpatient Medications:     cholecalciferol (VITAMIN D3) 25 mcg (1,000 units) tablet    cyanocobalamin (VITAMIN B-12) 500 MCG tablet    Multiple Vitamin (MULTIVITAMIN ADULT PO)    psyllium (METAMUCIL) 58 6 % packet    LORazepam (ATIVAN) 1 mg tablet  No Known Allergies  Reviewed medications and allergies and updated as indicated    PHYSICAL EXAM:    Blood pressure 152/80, height 5' 6" (1 676 m), weight 78 3 kg (172 lb 9 6 oz)  Body mass index is 27 86 kg/m²  General Appearance: NAD, cooperative, alert  Eyes: Anicteric, PERRLA, EOMI  ENT:  Normocephalic, atraumatic, normal mucosa  Neck:  Supple, symmetrical, trachea midline  Resp:  Clear to auscultation bilaterally; no rales, rhonchi or wheezing; respirations unlabored   CV:  S1 S2, Regular rate and rhythm; no murmur, rub, or gallop    GI:  Soft, non-tender, non-distended; normal bowel sounds; no masses, no organomegaly   Rectal: Deferred  Musculoskeletal: No cyanosis, clubbing or edema  Normal ROM  Skin:  No jaundice, rashes, or lesions   Heme/Lymph: No palpable cervical lymphadenopathy  Psych: Normal affect, good eye contact  Neuro: No gross deficits, AAOx3    Lab Results:   None recently  Radiology Results:   No results found

## 2022-03-21 NOTE — PATIENT INSTRUCTIONS
Elevate the head of the bed at the bed posts  Continue Metamucil with large glass of water daily  Can use Colace or MiraLax as needed  Can use antacids or Pepcid as needed for reflux    Can try melatonin or herbal tea at bedtime to see if this helps

## 2023-01-08 NOTE — LETTER
March 21, 2022     Lisa Cohn DO  [address] [facility] [address]    Patient: Cuong Patino   YOB: 1935   Date of Visit: 3/21/2022       Dear Dr Mullen Shown: Thank you for referring Carmen Cuevas to me for evaluation  Below are my notes for this consultation  If you have questions, please do not hesitate to call me  I look forward to following your patient along with you  Sincerely,        Kimberly Govea MD        CC: No Recipients  Kimberly Govea MD  3/21/2022  4:02 PM  Sign when Signing Visit  [facility] - Outpatient Follow-up Note  Cuong Patino 82 y o  male MRN: [id number]  Encounter: [id number]    ASSESSMENT AND PLAN:      1  Gastroesophageal reflux disease, unspecified whether esophagitis present  Vague upper GI symptoms which could be consistent with acid reflux disease  Does not want to take any acid blockers  - elevate the head of the bed  - avoid eating before going to bed  - okay to use low-dose Pepcid at bedtime if he is willing    2  Slow transit constipation  History of diarrhea predominant IBS  Now having more issues with constipation  Reports even low-dose MiraLax gives him diarrhea  Colace has been ineffective in the past  - fiber supplementation daily  - can use low-dose MiraLax or Colace as needed for constipation  - at this point time we are not going to repeat his colonoscopy      Followup Appointment:  6 months  ______________________________________________________________________    Chief Complaint   Patient presents with    Follow IBS     HPI:  The patient is seen back in the office today  He was evaluated early this year related to right-sided abdominal pain and constipation  He also had issues with GERD  CT scan revealed significant constipation but no other significant findings  He was treated with Metamucil and increase fluid intake    He was supposed to use a half a capful of MiraLax daily since he reports MiraLax gives him diarrhea  He reports he is moving his bowels better on the Metamucil alone  He is continues to be apprehensive to take MiraLax or Colace  He continues to have vague right-sided abdominal pain  He is also having issues with nighttime reflux and heartburn  He reports that he has issues sleeping will sometimes wake up in the middle the night  He denies any dysphagia, odynophagia, early satiety  His weight is stable      Historical Information   Past Medical History:   Diagnosis Date    Anemia     Anxiety     Basal cell carcinoma     Cancer ([address] ) 2017    melanoma    Hiatal hernia     Hypertension     Irritable bowel syndrome     Melanoma ([address]     Scan    Sciatica of left side 06/2019    Vertigo 2008     Past Surgical History:   Procedure Laterality Date    COLONOSCOPY  12/01/2009     by Dr Guille Johnson and was normal    HIATAL HERNIA REPAIR  10/2018    First repair was in 2000, 2nd in 2018   [facility] Bilateral      Social History     Substance and Sexual Activity   Alcohol Use Not Currently    Alcohol/week: 0 0 standard drinks    Comment:  recently he is not having any alcohol     Social History     Substance and Sexual Activity   Drug Use Never     Social History     Tobacco Use   Smoking Status Never Smoker   Smokeless Tobacco Never Used     Family History   Problem Relation Age of Onset    Post-traumatic stress disorder Father     Depression Father         PTSD    Cancer Family         Multiple cancers in aunts and uncles, in [place] Colon cancer Neg Hx     Colon polyps Neg Hx          Current Outpatient Medications:     cholecalciferol (VITAMIN D3) 25 mcg (1,000 units) tablet    cyanocobalamin (VITAMIN B-12) 500 MCG tablet    Multiple Vitamin (MULTIVITAMIN ADULT PO)    psyllium (METAMUCIL) 58 6 % packet    LORazepam (ATIVAN) 1 mg tablet  No Known Allergies  Reviewed medications and allergies and updated as indicated    PHYSICAL EXAM:    Blood pressure 152/80, height 5' 6" (1 676 m), weight 78 3 kg (172 lb 9 6 oz)  Body mass index is 27 86 kg/m²  General Appearance: NAD, cooperative, alert  Eyes: Anicteric, PERRLA, EOMI  ENT:  Normocephalic, atraumatic, normal mucosa  Neck:  Supple, symmetrical, trachea midline  Resp:  Clear to auscultation bilaterally; no rales, rhonchi or wheezing; respirations unlabored   CV:  S1 S2, Regular rate and rhythm; no murmur, rub, or gallop  GI:  Soft, non-tender, non-distended; normal bowel sounds; no masses, no organomegaly   Rectal: Deferred  Musculoskeletal: No cyanosis, clubbing or edema  Normal ROM  Skin:  No jaundice, rashes, or lesions   Heme/Lymph: No palpable cervical lymphadenopathy  Psych: Normal affect, good eye contact  Neuro: No gross deficits, AAOx3    Lab Results:   None recently  Radiology Results:   No results found  08-Jan-2023 16:17